# Patient Record
Sex: MALE | Race: WHITE
[De-identification: names, ages, dates, MRNs, and addresses within clinical notes are randomized per-mention and may not be internally consistent; named-entity substitution may affect disease eponyms.]

---

## 2018-02-06 ENCOUNTER — HOSPITAL ENCOUNTER (EMERGENCY)
Dept: HOSPITAL 17 - PHED | Age: 53
Discharge: HOME | End: 2018-02-06
Payer: COMMERCIAL

## 2018-02-06 VITALS — BODY MASS INDEX: 36.07 KG/M2 | HEIGHT: 66 IN | WEIGHT: 224.43 LBS

## 2018-02-06 VITALS
SYSTOLIC BLOOD PRESSURE: 201 MMHG | HEART RATE: 93 BPM | OXYGEN SATURATION: 97 % | TEMPERATURE: 97.2 F | DIASTOLIC BLOOD PRESSURE: 93 MMHG | RESPIRATION RATE: 14 BRPM

## 2018-02-06 VITALS
DIASTOLIC BLOOD PRESSURE: 91 MMHG | SYSTOLIC BLOOD PRESSURE: 191 MMHG | RESPIRATION RATE: 18 BRPM | HEART RATE: 95 BPM | OXYGEN SATURATION: 94 %

## 2018-02-06 DIAGNOSIS — I10: ICD-10-CM

## 2018-02-06 DIAGNOSIS — E78.00: ICD-10-CM

## 2018-02-06 DIAGNOSIS — J40: Primary | ICD-10-CM

## 2018-02-06 DIAGNOSIS — B34.9: ICD-10-CM

## 2018-02-06 LAB
BASOPHILS # BLD AUTO: 0.1 TH/MM3 (ref 0–0.2)
BASOPHILS NFR BLD: 1.8 % (ref 0–2)
BUN SERPL-MCNC: 12 MG/DL (ref 7–18)
CALCIUM SERPL-MCNC: 8.8 MG/DL (ref 8.5–10.1)
CHLORIDE SERPL-SCNC: 105 MEQ/L (ref 98–107)
CREAT SERPL-MCNC: 1.4 MG/DL (ref 0.6–1.3)
EOSINOPHIL # BLD: 0 TH/MM3 (ref 0–0.4)
EOSINOPHIL NFR BLD: 0.2 % (ref 0–4)
ERYTHROCYTE [DISTWIDTH] IN BLOOD BY AUTOMATED COUNT: 12.3 % (ref 11.6–17.2)
GFR SERPLBLD BASED ON 1.73 SQ M-ARVRAT: 54 ML/MIN (ref 89–?)
GLUCOSE SERPL-MCNC: 141 MG/DL (ref 74–106)
HCO3 BLD-SCNC: 20.2 MEQ/L (ref 21–32)
HCT VFR BLD CALC: 47.2 % (ref 39–51)
HGB BLD-MCNC: 15.6 GM/DL (ref 13–17)
LYMPHOCYTES # BLD AUTO: 0.2 TH/MM3 (ref 1–4.8)
LYMPHOCYTES NFR BLD AUTO: 3.1 % (ref 9–44)
MCH RBC QN AUTO: 27.9 PG (ref 27–34)
MCHC RBC AUTO-ENTMCNC: 33.1 % (ref 32–36)
MCV RBC AUTO: 84.3 FL (ref 80–100)
MONOCYTE #: 0.5 TH/MM3 (ref 0–0.9)
MONOCYTES NFR BLD: 6.1 % (ref 0–8)
NEUTROPHILS # BLD AUTO: 6.9 TH/MM3 (ref 1.8–7.7)
NEUTROPHILS NFR BLD AUTO: 88.8 % (ref 16–70)
PLATELET # BLD: 191 TH/MM3 (ref 150–450)
PMV BLD AUTO: 8.9 FL (ref 7–11)
RBC # BLD AUTO: 5.6 MIL/MM3 (ref 4.5–5.9)
SODIUM SERPL-SCNC: 134 MEQ/L (ref 136–145)
WBC # BLD AUTO: 7.7 TH/MM3 (ref 4–11)

## 2018-02-06 PROCEDURE — 87804 INFLUENZA ASSAY W/OPTIC: CPT

## 2018-02-06 PROCEDURE — 80048 BASIC METABOLIC PNL TOTAL CA: CPT

## 2018-02-06 PROCEDURE — 99284 EMERGENCY DEPT VISIT MOD MDM: CPT

## 2018-02-06 PROCEDURE — 71045 X-RAY EXAM CHEST 1 VIEW: CPT

## 2018-02-06 PROCEDURE — 96361 HYDRATE IV INFUSION ADD-ON: CPT

## 2018-02-06 PROCEDURE — 85025 COMPLETE CBC W/AUTO DIFF WBC: CPT

## 2018-02-06 PROCEDURE — 96375 TX/PRO/DX INJ NEW DRUG ADDON: CPT

## 2018-02-06 PROCEDURE — 96374 THER/PROPH/DIAG INJ IV PUSH: CPT

## 2018-02-06 NOTE — RADRPT
EXAM DATE/TIME:  02/06/2018 05:39 

 

[facility] COMPARISON:     

No previous studies available for comparison.

 

                     

INDICATIONS :     

Cough, congestion.

                     

 

MEDICAL HISTORY :     

None.          

 

SURGICAL HISTORY :     

None.   

 

ENCOUNTER:     

Initial                                        

 

ACUITY:     

3 days      

 

PAIN SCORE:     

0/10

 

LOCATION:     

Bilateral chest 

 

FINDINGS:     

Portable AP view of the chest demonstrates a normal-sized cardiac silhouette. No effusion, consolidat
ion, or pneumothorax is visualized. The bones and soft tissues demonstrate no acute abnormality.

 

CONCLUSION:     

No acute cardiopulmonary abnormality is identified.

 

 

 

 Aston Silva MD on February 06, 2018 at 6:04           

Board Certified Radiologist.

 This report was verified electronically.

## 2018-02-06 NOTE — PD
HPI


Chief Complaint:  GI Complaint


Time Seen by Provider:  05:15


Travel History


International Travel<30 days:  No


Contact w/Intl Traveler<30days:  No


Traveled to known affect area:  No





History of Present Illness


HPI


52-year-old male complains of headache, coughing congestion, nausea vomiting, 

fever, body ache.  Patient states the symptoms started about 4 days ago.  

Patient states that the cough is persistent and nonproductive.  Patient denies 

any chest pain or shortness of breath.  Patient states he has intermittent 

abdominal cramping.  Patient states that he is unable to keep anything down for 

the past 2 days.





PFSH


Past Medical History


Arthritis:  No


Asthma:  No


Autoimmune Disease:  No


Blood Disorders:  No


Anxiety:  Yes


Heart Rhythm Problems:  No


Cancer:  No


Cardiovascular Problems:  No


High Cholesterol:  Yes


Chemotherapy:  No


Chest Pain:  No


Congestive Heart Failure:  No


COPD:  No


Diabetes:  No


Diminished Hearing:  No


Endocrine:  No


GERD:  No


Glaucoma:  No


Genitourinary:  No


Hepatitis:  No


Hiatal Hernia:  No


Hypertension:  Yes


Musculoskeletal:  No


Neurologic:  Yes


Psychiatric:  Yes


Respiratory:  No


Myocardial Infarction:  No


Radiation Therapy:  No


Sickle Cell Disease:  No


Sleep Apnea:  No


Thyroid Disease:  No


Ulcer:  No


Influenza Vaccination:  No





Past Surgical History


Abdominal Surgery:  Yes (REMOVAL OF HEMORRHOIDS)


AICD:  No


Cardiac Surgery:  No


Ear Surgery:  No


Endocrine Surgery:  No


Eye Surgery:  No


Genitourinary Surgery:  Yes (HEMORROIDS)


Gynecologic Surgery:  No


Oral Surgery:  No


Pacemaker:  No


Thoracic Surgery:  No


Other Surgery:  Yes





Social History


Alcohol Use:  Yes ("ONCE IN A BLUE MOON")


Tobacco Use:  No


Substance Use:  No





Allergies-Medications


(Allergen,Severity, Reaction):  


Coded Allergies:  


     No Known Allergies (Verified  Adverse Reaction, Unknown, 2/6/18)


Reported Meds & Prescriptions





Reported Meds & Active Scripts


Active


Zithromax Z-Alejandro (Azithromycin) 250 Mg Dspk 250 Mg PO AS DIRECTED


     500 MG (2 tabs) day 1, then 1 tab days 2-5.


[Phenergan W Codein]   10 Ml PO Q8HR


Zofran Odt (Ondansetron Odt) 4 Mg Tab 4 Mg SL Q6HR PRN


Reported


Atenolol 25 Mg Tab 25 Mg PO DAILY


Atorvastatin (Atorvastatin Calcium) 10 Mg Tab 10 Mg PO HS








Review of Systems


General / Constitutional:  Positive: Fever


Eyes:  No: Visual changes


HENT:  No: Headaches


Cardiovascular:  No: Chest Pain or Discomfort


Respiratory:  Positive: Cough, No: Shortness of Breath


Gastrointestinal:  Positive: Nausea, Vomiting, Abdominal Pain


Genitourinary:  No: Dysuria


Musculoskeletal:  No: Pain


Skin:  No Rash


Neurologic:  No: Weakness


Psychiatric:  No: Depression


Endocrine:  No: Polydipsia


Hematologic/Lymphatic:  No: Easy Bruising





Physical Exam


Narrative


GENERAL: Well-nourished, well-developed patient.


SKIN: Focused skin assessment warm/dry.


HEAD: Normocephalic.


EYES: No scleral icterus. No injection or drainage. 


TM: Clear.


Throat: Mild erythematous.


NECK: Supple, trachea midline. No JVD or lymphadenopathy.


CARDIOVASCULAR: Regular rate and rhythm without murmurs, gallops, or rubs. 


RESPIRATORY: Breath sounds equal bilaterally. No accessory muscle use.


GASTROINTESTINAL: Abdomen soft, non-tender, nondistended. 


MUSCULOSKELETAL: No cyanosis, or edema. 


BACK: Nontender without obvious deformity. No CVA tenderness.





Data


Data


Last Documented VS





Vital Signs








  Date Time  Temp Pulse Resp B/P (MAP) Pulse Ox O2 Delivery O2 Flow Rate FiO2


 


2/6/18 06:06  95 18 191/91 (124) 94 Room Air  


 


2/6/18 05:19 97.2       








Orders





 Orders


Sodium Chlor 0.9% 1000 Ml Inj (Ns 1000 M (2/6/18 05:45)


Ondansetron Inj (Zofran Inj) (2/6/18 05:45)


Ketorolac Inj (Toradol Inj) (2/6/18 05:45)


Complete Blood Count With Diff (2/6/18 05:32)


Basic Metabolic Panel (Bmp) (2/6/18 05:32)


Influenzae A/B Antigen (2/6/18 05:32)


Chest, Single Ap (2/6/18 05:32)


Iv Access Insert/Monitor (2/6/18 05:32)





Labs





Laboratory Tests








Test


  2/6/18


05:35


 


White Blood Count 7.7 TH/MM3 


 


Red Blood Count 5.60 MIL/MM3 


 


Hemoglobin 15.6 GM/DL 


 


Hematocrit 47.2 % 


 


Mean Corpuscular Volume 84.3 FL 


 


Mean Corpuscular Hemoglobin 27.9 PG 


 


Mean Corpuscular Hemoglobin


Concent 33.1 % 


 


 


Red Cell Distribution Width 12.3 % 


 


Platelet Count 191 TH/MM3 


 


Mean Platelet Volume 8.9 FL 


 


Neutrophils (%) (Auto) 88.8 % 


 


Lymphocytes (%) (Auto) 3.1 % 


 


Monocytes (%) (Auto) 6.1 % 


 


Eosinophils (%) (Auto) 0.2 % 


 


Basophils (%) (Auto) 1.8 % 


 


Neutrophils # (Auto) 6.9 TH/MM3 


 


Lymphocytes # (Auto) 0.2 TH/MM3 


 


Monocytes # (Auto) 0.5 TH/MM3 


 


Eosinophils # (Auto) 0.0 TH/MM3 


 


Basophils # (Auto) 0.1 TH/MM3 


 


CBC Comment DIFF FINAL 


 


Differential Comment  


 


Blood Urea Nitrogen 12 MG/DL 


 


Creatinine 1.40 MG/DL 


 


Random Glucose 141 MG/DL 


 


Calcium Level 8.8 MG/DL 


 


Sodium Level 134 MEQ/L 


 


Potassium Level 4.1 MEQ/L 


 


Chloride Level 105 MEQ/L 


 


Carbon Dioxide Level 20.2 MEQ/L 


 


Anion Gap 9 MEQ/L 


 


Estimat Glomerular Filtration


Rate 54 ML/MIN 


 











MDM


Medical Decision Making


Medical Screen Exam Complete:  Yes


Emergency Medical Condition:  Yes


Interpretation(s)





Last Impressions








Chest X-Ray 2/6/18 0532 Signed





Impressions: 





 Service Date/Time:  Tuesday, February 6, 2018 05:39 - CONCLUSION:  No acute 





 cardiopulmonary abnormality is identified.     Aston Silva MD 





Influenza AB antigen negative.  CBC within normal limit.  Workup 20.2.  

Creatinine 1.4.  Glucose 141.


Differential Diagnosis


Differential diagnosis including viral syndrome, bronchitis, pneumonia, 

gastroenteritis, dehydration, electrolyte imbalance.


Narrative Course


52-year-old male with fever chills, coughing congestion, nausea vomiting and 

abdominal pain.  Normal saline solution 1 L IV bolus.  Toradol 30 mg IV.  

Zofran 4 mg IV.





Diagnosis





 Primary Impression:  


 Bronchitis


 Additional Impression:  


 Viral syndrome


Patient Instructions:  General Instructions





***Additional Instructions:  


Take medications as directed.  Tylenol ibuprofen for fever aching pain.  

Encourage by mouth fluid.  Follow-up with personal physician.  Return if 

persistent problem or worse.


***Med/Other Pt SpecificInfo:  Prescription(s) given


Scripts


Azithromycin (Zithromax Z-Alejandro) 250 Mg Dspk


250 MG PO AS DIRECTED for Infection, #1 DSPK 0 Refills


   500 MG (2 tabs) day 1, then 1 tab days 2-5.


   Prov: Anthony Ruiz MD         2/6/18 


[Phenergan W Codein]   No Conflict Check


10 ML PO Q8HR for Cough, #120


   Prov: Anthony Ruiz MD         2/6/18 


Ondansetron Odt (Zofran Odt) 4 Mg Tab


4 MG SL Q6HR Y for Nausea/Vomiting, #10 TAB 0 Refills


   Prov: Anthony Ruiz MD         2/6/18


Disposition:  01 DISCHARGE HOME


Condition:  Stable











Anthony Ruiz MD Feb 6, 2018 05:39